# Patient Record
Sex: MALE | Race: WHITE | NOT HISPANIC OR LATINO | URBAN - NONMETROPOLITAN AREA
[De-identification: names, ages, dates, MRNs, and addresses within clinical notes are randomized per-mention and may not be internally consistent; named-entity substitution may affect disease eponyms.]

---

## 2022-05-27 ENCOUNTER — OFFICE VISIT (OUTPATIENT)
Dept: PODIATRY | Facility: CLINIC | Age: 32
End: 2022-05-27

## 2022-05-27 VITALS — OXYGEN SATURATION: 98 % | HEIGHT: 63 IN | WEIGHT: 165.4 LBS | HEART RATE: 74 BPM | BODY MASS INDEX: 29.3 KG/M2

## 2022-05-27 DIAGNOSIS — L60.0 INGROWN TOENAIL: Primary | ICD-10-CM

## 2022-05-27 DIAGNOSIS — M79.675 PAIN OF TOE OF LEFT FOOT: ICD-10-CM

## 2022-05-27 PROCEDURE — 11750 EXCISION NAIL&NAIL MATRIX: CPT | Performed by: PODIATRIST

## 2022-05-27 PROCEDURE — 99203 OFFICE O/P NEW LOW 30 MIN: CPT | Performed by: PODIATRIST

## 2022-05-27 NOTE — PROGRESS NOTES
"Randolph Delong  1990  31 y.o. male       Patient came to clinic for concern of a ingrown on left hallux.     05/27/2022  Chief Complaint   Patient presents with   • Left Foot - Ingrown Toenail           History of Present Illness    Randolph Delong is a 31 y.o. male who presents for issue with ingrowing left great toenail.  There is a chronic, intermittent issue for him over several years.  Notes sharp tenderness palpation, increased erythema.      Past Medical History:   Diagnosis Date   • Asthma    • Ingrown toenail          Past Surgical History:   Procedure Laterality Date   • FACIAL COSMETIC SURGERY           History reviewed. No pertinent family history.      Social History     Socioeconomic History   • Marital status:    Tobacco Use   • Smoking status: Never Smoker   • Smokeless tobacco: Never Used   Vaping Use   • Vaping Use: Never used   Substance and Sexual Activity   • Alcohol use: Defer   • Drug use: Defer   • Sexual activity: Defer         No current outpatient medications on file.     No current facility-administered medications for this visit.         OBJECTIVE    Pulse 74   Ht 160 cm (63\")   Wt 75 kg (165 lb 6.4 oz)   SpO2 98%   BMI 29.30 kg/m²       Review of Systems   Constitutional: Negative.    HENT: Negative.    Eyes: Negative.    Respiratory: Negative.    Cardiovascular: Negative.    Gastrointestinal: Negative.    Endocrine: Negative.    Genitourinary: Negative.    Musculoskeletal:        Left foot pain      Skin: Negative.    Allergic/Immunologic: Negative.    Neurological: Negative.    Hematological: Negative.    Psychiatric/Behavioral: Negative.          Physical Exam   Constitutional: he appears well-developed and well-nourished.   CV: No chest pain. Normal RR  Resp: Non labored respirations  Psychiatric: he has a normal mood and affect. her behavior is normal.      Lower Extremity Exam:  Vascular: DP/PT pulses palpable 2+.   Mild left hallux edema  Toes warm  Neuro: " Protective sensation intact, b/l.  DTRs intact  Integument: No open wounds.  Ingrown lateral nail border, left hallux. Mild erythema, edema. +tenderness to palpation.  Web spaces c/d/i  No skin lesions  Musculoskeletal: LE muscle strength 5/5.   Gait normal  Ankle ROM full without pain or crepitus  STJ ROM full without pain or crepitus  No digital deformities      Nail Removal    Date/Time: 5/27/2022 11:06 AM  Performed by: Gaurav Mendoza DPM  Authorized by: Gaurav Mendoza DPM   Consent: Written consent obtained.  Risks and benefits: risks, benefits and alternatives were discussed  Consent given by: patient  Location: left foot  Location details: left big toe  Anesthesia: digital block    Anesthesia:  Local Anesthetic: lidocaine 2% without epinephrine  Anesthetic total: 3 mL  Preparation: skin prepped with Betadine  Amount removed: partial  Side: lateral  Nail matrix removed: partial  Dressing: 4x4 and antibiotic ointment  Patient tolerance: patient tolerated the procedure well with no immediate complications  Comments: Matrixectomy with 10% NaOH. Neutralized with acetic acid.                    ASSESSMENT AND PLAN    Diagnoses and all orders for this visit:    1. Ingrown toenail (Primary)    2. Pain of toe of left foot      -Comprehensive foot and ankle exam performed  -Diagnosis, prevention, and treatment of ingrown toe nails discussed with patient, including risks and potential benefits of nail avulsion both temporary and permanent versus simple debridement.  -Pt elected for partial permanent avulsion of left hallux  -Aftercare instructions for soapy gibson soaks BID  -Recheck 2 weeks          This document has been electronically signed by Gaurav Mendoza DPM on May 28, 2022 11:06 CDT       Much of this encounter note is an electronic transcription/translation of spoken language to printed text.   Gaurav Mendoza DPM  5/28/2022  11:06 CDT

## 2023-06-30 PROBLEM — R10.13 EPIGASTRIC PAIN: Status: ACTIVE | Noted: 2023-06-30

## 2023-06-30 PROBLEM — R11.0 NAUSEA: Status: ACTIVE | Noted: 2023-06-30

## 2023-06-30 NOTE — H&P (VIEW-ONLY)
[facility]      Chief Complaint:   Chief Complaint   Patient presents with   • Abdominal Pain       Subjective     HPI:   Patient with abdominal pain.  Patient states that this is epigastric and has been lasting for the past 2 to 3 weeks.  Patient states it gets bad at times but also goes away at times does not go up in the right upper quadrant.  Patient gets some episodes of nausea but no vomiting.    Plan; we will schedule patient for EGD also lites ultrasound of the right upper quadrant.  We will also do lab work including CMP CBC amylase alpha gal and GI distress panel lipase    Past Medical History:   Past Medical History:   Diagnosis Date   • Asthma    • Ingrown toenail        Past Surgical History:    Past Surgical History:   Procedure Laterality Date   • FACIAL COSMETIC SURGERY     • REFRACTIVE SURGERY Bilateral        Family History:  History reviewed. No pertinent family history.    Social History:   reports that he has never smoked. He has never used smokeless tobacco. Alcohol use questions deferred to the physician. Drug use questions deferred to the physician.    Medications:   Prior to Admission medications    Medication Sig Start Date End Date Taking? Authorizing Provider   Brelorrie Ellipta 200-25 MCG/ACT inhaler  6/13/23  Yes Rao Luis MD   montelukast (SINGULAIR) 10 MG tablet  6/13/23  Yes Rao Luis MD   albuterol sulfate  (90 Base) MCG/ACT inhaler Inhale See Admin Instructions. Inhale 2 puffs by mouth every 4 to 6 hours as needed  Patient not taking: Reported on 6/30/2023 6/28/23   Rao Luis MD   pantoprazole (PROTONIX) 40 MG EC tablet Take 1 tablet by mouth Daily. 6/30/23   Maulik Gonzalez MD   pseudoephedrine-guaifenesin (MUCINEX D)  MG per 12 hr tablet Take 1 tablet by mouth As Needed.  Patient not taking: Reported on 6/30/2023    Rao Luis MD       Allergies:  Patient has no known  "allergies.    ROS:    Review of Systems   Constitutional:  Negative for activity change, appetite change and unexpected weight change.   HENT:  Negative for congestion, sore throat and trouble swallowing.    Respiratory:  Negative for cough, choking and shortness of breath.    Cardiovascular:  Negative for chest pain.   Gastrointestinal:  Positive for abdominal pain. Negative for abdominal distention, anal bleeding, blood in stool, constipation, diarrhea, nausea, rectal pain and vomiting.   Endocrine: Negative for heat intolerance, polydipsia and polyphagia.   Genitourinary:  Negative for difficulty urinating.   Musculoskeletal:  Negative for arthralgias.   Skin:  Negative for color change, pallor, rash and wound.   Allergic/Immunologic: Negative for food allergies.   Neurological:  Negative for dizziness, syncope, weakness and headaches.   Psychiatric/Behavioral:  Negative for agitation, behavioral problems, confusion and decreased concentration.    Objective     Blood pressure 116/72, pulse 88, height 165.1 cm (65\"), weight 76.1 kg (167 lb 12.8 oz), SpO2 97 %.    Physical Exam  Constitutional:       General: He is not in acute distress.     Appearance: He is well-developed. He is not diaphoretic.   HENT:      Head: Normocephalic and atraumatic.   Cardiovascular:      Rate and Rhythm: Normal rate and regular rhythm.      Heart sounds: Normal heart sounds. No murmur heard.    No friction rub. No gallop.   Pulmonary:      Effort: No respiratory distress.      Breath sounds: Normal breath sounds. No wheezing or rales.   Chest:      Chest wall: No tenderness.   Abdominal:      General: Bowel sounds are normal. There is no distension.      Palpations: Abdomen is soft. There is no mass.      Tenderness: There is no abdominal tenderness. There is no guarding or rebound.      Hernia: No hernia is present.   Musculoskeletal:         General: Normal range of motion.   Skin:     General: Skin is warm and dry.      Coloration: " Skin is not pale.      Findings: No erythema or rash.   Neurological:      Mental Status: He is alert and oriented to person, place, and time.   Psychiatric:         Behavior: Behavior normal.         Thought Content: Thought content normal.         Judgment: Judgment normal.        Assessment & Plan   Diagnoses and all orders for this visit:    1. Epigastric pain (Primary)  -     Case Request; Standing  -     dextrose 5 % and sodium chloride 0.45 % infusion  -     Case Request  -     US Abdomen Complete; Future  -     Alpha-Gal IgE Panel; Future  -     Recurrent Gastrointestinal Distress; Future  -     Comprehensive Metabolic Panel  -     Amylase  -     Lipase    2. Nausea  -     Case Request; Standing  -     dextrose 5 % and sodium chloride 0.45 % infusion  -     Case Request  -     US Abdomen Complete; Future  -     Alpha-Gal IgE Panel; Future  -     Recurrent Gastrointestinal Distress; Future  -     Comprehensive Metabolic Panel  -     Amylase  -     Lipase    Other orders  -     Implement Anesthesia Orders Day of Procedure; Standing  -     Obtain Informed Consent; Standing  -     Follow Anesthesia Guidelines / Protocol; Future  -     Obtain Informed Consent; Future  -     POC Glucose Once; Standing  -     Insert Peripheral IV; Standing  -     pantoprazole (PROTONIX) 40 MG EC tablet; Take 1 tablet by mouth Daily.  Dispense: 30 tablet; Refill: 5        ESOPHAGOGASTRODUODENOSCOPY (N/A)     Diagnosis Plan   1. Epigastric pain  Case Request    dextrose 5 % and sodium chloride 0.45 % infusion    Case Request    US Abdomen Complete    Alpha-Gal IgE Panel    Recurrent Gastrointestinal Distress    Comprehensive Metabolic Panel    Amylase    Lipase      2. Nausea  Case Request    dextrose 5 % and sodium chloride 0.45 % infusion    Case Request    US Abdomen Complete    Alpha-Gal IgE Panel    Recurrent Gastrointestinal Distress    Comprehensive Metabolic Panel    Amylase    Lipase          Anticipated Surgical  Procedure:  Orders Placed This Encounter   Procedures   • US Abdomen Complete     Standing Status:   Future     Standing Expiration Date:   6/30/2024     Order Specific Question:   Reason for Exam:     Answer:   abd pain     Order Specific Question:   Release to patient     Answer:   Routine Release   • Alpha-Gal IgE Panel     Standing Status:   Future     Standing Expiration Date:   6/30/2024   • Recurrent Gastrointestinal Distress     Standing Status:   Future     Standing Expiration Date:   6/30/2024     Order Specific Question:   Release to patient     Answer:   Routine Release   • Comprehensive Metabolic Panel     Order Specific Question:   Release to patient     Answer:   Routine Release   • Amylase     Order Specific Question:   Release to patient     Answer:   Routine Release   • Lipase     Order Specific Question:   Release to patient     Answer:   Routine Release   • Obtain Informed Consent     Standing Status:   Future     Order Specific Question:   Informed Consent Given For     Answer:   ESOPHAGOGASTRODUODENOSCOPY       The risks, benefits, and alternatives of this procedure have been discussed with the patient or the responsible party- the patient understands and agrees to proceed.

## 2023-07-21 ENCOUNTER — HOSPITAL ENCOUNTER (OUTPATIENT)
Facility: HOSPITAL | Age: 33
Setting detail: HOSPITAL OUTPATIENT SURGERY
Discharge: HOME | End: 2023-07-21
Attending: INTERNAL MEDICINE | Admitting: INTERNAL MEDICINE
Payer: COMMERCIAL

## 2023-07-21 VITALS
TEMPERATURE: 97.1 F | BODY MASS INDEX: 27.82 KG/M2 | HEART RATE: 69 BPM | WEIGHT: 167 LBS | OXYGEN SATURATION: 97 % | DIASTOLIC BLOOD PRESSURE: 64 MMHG | RESPIRATION RATE: 18 BRPM | SYSTOLIC BLOOD PRESSURE: 115 MMHG | HEIGHT: 65 IN

## 2023-07-21 DIAGNOSIS — R10.13 EPIGASTRIC PAIN: ICD-10-CM

## 2023-07-21 DIAGNOSIS — R11.0 NAUSEA: ICD-10-CM

## 2023-07-21 PROCEDURE — 43239 EGD BIOPSY SINGLE/MULTIPLE: CPT | Performed by: INTERNAL MEDICINE

## 2023-07-21 RX ORDER — DEXTROSE AND SODIUM CHLORIDE 5; .45 G/100ML; G/100ML
30 INJECTION, SOLUTION INTRAVENOUS CONTINUOUS PRN
Status: DISCONTINUED | OUTPATIENT
Start: 2023-07-21 | End: 2023-07-21 | Stop reason: HOSPADM

## 2023-07-21 RX ADMIN — DEXTROSE AND SODIUM CHLORIDE 30 ML/HR: 5; 450 INJECTION, SOLUTION INTRAVENOUS at 09:31

## 2023-07-25 LAB — REF LAB TEST METHOD: NORMAL

## 2023-07-28 ENCOUNTER — OFFICE VISIT (OUTPATIENT)
Dept: GASTROENTEROLOGY | Facility: CLINIC | Age: 33
End: 2023-07-28
Payer: COMMERCIAL

## 2023-07-28 VITALS
HEART RATE: 84 BPM | HEIGHT: 65 IN | DIASTOLIC BLOOD PRESSURE: 70 MMHG | BODY MASS INDEX: 28.02 KG/M2 | SYSTOLIC BLOOD PRESSURE: 133 MMHG | WEIGHT: 168.2 LBS

## 2023-07-28 DIAGNOSIS — R10.10 PAIN OF UPPER ABDOMEN: Primary | ICD-10-CM

## 2023-07-28 RX ORDER — PANTOPRAZOLE SODIUM 40 MG/1
40 TABLET, DELAYED RELEASE ORAL DAILY
Qty: 30 TABLET | Refills: 5 | Status: SHIPPED | OUTPATIENT
Start: 2023-07-28

## 2023-07-28 NOTE — PROGRESS NOTES
[facility]      Chief Complaint:   Chief Complaint   Patient presents with    Abdominal Pain       Subjective     HPI:   Patient with some improvement in abdominal pain.  Patient has been taking Protonix.  Patient had ultrasound the abdomen which was negative patient EGD which is essentially negative other than some mild esophagitis gastritis.  Patient on his lab work has a mild allergy to shrimp.  Patient does eat a large amount of shrimp and states he will try to stop this for a few months to see if any improvement in symptoms.  Discussed with patient that if no improvement in symptoms he can again [facility].    Plan; outpatient follow-up in 3 months if any improvement in symptoms    Past Medical History:   Past Medical History:   Diagnosis Date    Asthma     GERD (gastroesophageal reflux disease)     Ingrown toenail        Past Surgical History:    Past Surgical History:   Procedure Laterality Date    FACIAL COSMETIC SURGERY      REFRACTIVE SURGERY Bilateral        Family History:  History reviewed. No pertinent family history.    Social History:   reports that he has never smoked. He has never used smokeless tobacco. He reports that he does not drink alcohol and does not use drugs.    Medications:   Prior to Admission medications    Medication Sig Start Date End Date Taking? Authorizing Provider   albuterol sulfate  (90 Base) MCG/ACT inhaler Inhale 2 puffs Every 4 (Four) Hours As Needed. Inhale 2 puffs by mouth every 4 to 6 hours as needed 6/28/23  Yes ProviderRao MD Breo Ellipta 200-25 MCG/ACT inhaler Inhale 1 puff Daily As Needed. 6/13/23  Yes ProviderRao MD   montelukast (SINGULAIR) 10 MG tablet Take 1 tablet by mouth At Night As Needed. 6/13/23  Yes ProviderRao MD   pseudoephedrine-guaifenesin (MUCINEX D)  MG per 12 hr tablet Take 1 tablet by mouth As Needed.   Yes ProviderRao MD   pantoprazole (PROTONIX) 40 MG EC  "tablet Take 1 tablet by mouth Daily. 6/30/23 7/28/23 Yes Maulik Gonzalez MD   pantoprazole (PROTONIX) 40 MG EC tablet Take 1 tablet by mouth Daily. 7/28/23  Yes Maulik Gonzalez MD       Allergies:  Patient has no known allergies.    ROS:    Review of Systems   Constitutional:  Negative for activity change, appetite change and unexpected weight change.   HENT:  Negative for congestion, sore throat and trouble swallowing.    Respiratory:  Negative for cough, choking and shortness of breath.    Cardiovascular:  Negative for chest pain.   Gastrointestinal:  Positive for abdominal pain. Negative for abdominal distention, anal bleeding, blood in stool, constipation, diarrhea, nausea, rectal pain and vomiting.   Endocrine: Negative for heat intolerance, polydipsia and polyphagia.   Genitourinary:  Negative for difficulty urinating.   Musculoskeletal:  Negative for arthralgias.   Skin:  Negative for color change, pallor, rash and wound.   Allergic/Immunologic: Negative for food allergies.   Neurological:  Negative for dizziness, syncope, weakness and headaches.   Psychiatric/Behavioral:  Negative for agitation, behavioral problems, confusion and decreased concentration.    Objective     Blood pressure 133/70, pulse 84, height 165.1 cm (65\"), weight 76.3 kg (168 lb 3.2 oz).    Physical Exam  Constitutional:       General: He is not in acute distress.     Appearance: He is well-developed. He is not diaphoretic.   HENT:      Head: Normocephalic and atraumatic.   Cardiovascular:      Rate and Rhythm: Normal rate and regular rhythm.      Heart sounds: Normal heart sounds. No murmur heard.    No friction rub. No gallop.   Pulmonary:      Effort: No respiratory distress.      Breath sounds: Normal breath sounds. No wheezing or rales.   Chest:      Chest wall: No tenderness.   Abdominal:      General: Bowel sounds are normal. There is no distension.      Palpations: Abdomen is soft. There is no mass.      Tenderness: There is no " abdominal tenderness. There is no guarding or rebound.      Hernia: No hernia is present.   Musculoskeletal:         General: Normal range of motion.   Skin:     General: Skin is warm and dry.      Coloration: Skin is not pale.      Findings: No erythema or rash.   Neurological:      Mental Status: He is alert and oriented to person, place, and time.   Psychiatric:         Behavior: Behavior normal.         Thought Content: Thought content normal.         Judgment: Judgment normal.        Assessment & Plan   Diagnoses and all orders for this visit:    1. Pain of upper abdomen (Primary)    Other orders  -     pantoprazole (PROTONIX) 40 MG EC tablet; Take 1 tablet by mouth Daily.  Dispense: 30 tablet; Refill: 5        * Surgery not found *     Diagnosis Plan   1. Pain of upper abdomen            Anticipated Surgical Procedure:  No orders of the defined types were placed in this encounter.      The risks, benefits, and alternatives of this procedure have been discussed with the patient or the responsible party- the patient understands and agrees to proceed.